# Patient Record
Sex: FEMALE | Race: WHITE
[De-identification: names, ages, dates, MRNs, and addresses within clinical notes are randomized per-mention and may not be internally consistent; named-entity substitution may affect disease eponyms.]

---

## 2022-04-27 ENCOUNTER — HOSPITAL ENCOUNTER (OUTPATIENT)
Age: 52
End: 2022-04-27
Payer: COMMERCIAL

## 2022-04-27 DIAGNOSIS — R93.7: Primary | ICD-10-CM

## 2022-04-27 PROCEDURE — 72202 X-RAY EXAM SI JOINTS 3/> VWS: CPT

## 2022-07-30 ENCOUNTER — HOSPITAL ENCOUNTER (OUTPATIENT)
Age: 52
End: 2022-07-30
Payer: COMMERCIAL

## 2022-07-30 DIAGNOSIS — Z12.31: Primary | ICD-10-CM

## 2022-07-30 PROCEDURE — 77067 SCR MAMMO BI INCL CAD: CPT

## 2022-07-30 PROCEDURE — 77063 BREAST TOMOSYNTHESIS BI: CPT

## 2023-09-20 ENCOUNTER — HOSPITAL ENCOUNTER (OUTPATIENT)
Age: 53
End: 2023-09-20
Payer: COMMERCIAL

## 2023-09-20 DIAGNOSIS — M50.121: ICD-10-CM

## 2023-09-20 DIAGNOSIS — M47.22: Primary | ICD-10-CM

## 2023-09-20 PROCEDURE — 72050 X-RAY EXAM NECK SPINE 4/5VWS: CPT

## 2023-09-28 ENCOUNTER — HOSPITAL ENCOUNTER (OUTPATIENT)
Age: 53
End: 2023-09-28
Payer: COMMERCIAL

## 2023-09-28 DIAGNOSIS — M47.814: ICD-10-CM

## 2023-09-28 DIAGNOSIS — M48.10: Primary | ICD-10-CM

## 2023-09-28 DIAGNOSIS — M41.9: ICD-10-CM

## 2023-09-28 PROCEDURE — 72146 MRI CHEST SPINE W/O DYE: CPT

## 2024-09-18 ENCOUNTER — HOSPITAL ENCOUNTER (OUTPATIENT)
Dept: HOSPITAL 73 - MAMMO | Age: 54
End: 2024-09-18
Payer: COMMERCIAL

## 2024-09-18 DIAGNOSIS — R92.333: ICD-10-CM

## 2024-09-18 DIAGNOSIS — Z12.31: Primary | ICD-10-CM

## 2024-09-18 PROCEDURE — 77067 SCR MAMMO BI INCL CAD: CPT

## 2024-09-18 PROCEDURE — 77063 BREAST TOMOSYNTHESIS BI: CPT

## 2024-09-18 NOTE — DI.MG.S_ITS
BILATERAL DIGITAL SCREENING MAMMOGRAM 3D/2D WITH CAD: 9/18/2024 
  
CLINICAL: Routine screening.   
  
Comparison is made to exams dated:  7/30/2022 mammogram - [facility], 7/31/2019  
mammogram, and 12/26/2017 mammogram - outside facility.   
  
The breasts are heterogeneously dense, which may obscure small masses (category c /  
51-75% glandular tissue).   
  
Current study was also evaluated with a Computer Aided Detection (CAD) system.   
There are benign vascular calcifications in both breasts.   
No significant masses, calcifications, or other findings are seen in either breast.   
There has been no significant interval change. 
  
IMPRESSION: BENIGN 
There is no mammographic evidence of malignancy. A 1 year screening mammogram is  
recommended.   
  
Based on the Tyrer Cuzick model (a risk assessment model) the patient's lifetime risk is  
14.7% and her 10 year risk is 4.6%. According to the ACR, ACS, and NCCN guidelines, an  
annual breast MRI exam along with mammogram is recommended if the patient's lifetime risk  
is 20% or greater. 
  
  
This exam was interpreted at Station ID: [id number].   
  
NOTE: For mammograms, a report in lay terms will be sent to the patient. Approximately  
15% of breast malignancies will not be visualized mammographically. In the management of  
a palpable breast mass, a negative mammogram must not discourage biopsy of a clinically  
suspicious lesion. 
  
Electronically Signed By: Chelo [email or web address]:9/18/2024 18:01:34   
  
  
letter sent: Normal Exam   
ACR BI-RADS Category 2: Benign 3342F

## 2025-05-08 ENCOUNTER — HOSPITAL ENCOUNTER
Dept: HOSPITAL 73 - LAB | Age: 55
End: 2025-05-08
Payer: COMMERCIAL

## 2025-05-08 DIAGNOSIS — I49.9: Primary | ICD-10-CM

## 2025-05-08 LAB — TROPONIN I SERPL-MCNC: < 0.012 NG/ML (ref 0.01–0.03)

## 2025-05-08 PROCEDURE — 84484 ASSAY OF TROPONIN QUANT: CPT
